# Patient Record
(demographics unavailable — no encounter records)

---

## 2025-03-07 NOTE — HISTORY OF PRESENT ILLNESS
[de-identified] : Date of initial evaluation: 03/07/2025 Patient age: 14 year Body part causing symptoms: right knee Location of the pain: lateral Pain score today: 8/10 Duration of symptoms: 6 days History of injury: she fell and twisted the knee, felt a pop Activities that worsen the pain: walking, bending Radicular symptoms: none Medications attempted for this problem: Tylenol  PT for this problem: none  Injections for this problem: none Previous surgery on this body part: none  Prior imaging: [facility] Occupation: student

## 2025-03-07 NOTE — DISCUSSION/SUMMARY
[de-identified] : (Impression) -right knee lateral meniscus tear, possible ACL tear    The diagnosis was explained in detail. The potential non-surgical and surgical treatments were reviewed. The relative risks and benefits of each option were considered relative to the patient age, activity level, medical history, symptom severity and previously attempted treatments.  The patient was advised to consult with their primary medical provider prior to initiation of any new medications to reduce the risk of adverse effects specific to their long-term home medications and medical history. The risk of gastrointestinal irritation and kidney injury specific to long-term NSAID use was discussed.    (Plan)  -Over the counter NSAID for pain and inflammation, take as needed. -MRI of the knee ordered to evaluate for meniscus and/or ligament tear. -Weight bearing as tolerated. Crutches as needed. -Follow up when imaging completed.     (MDM) Problem Complexity -Moderate: acute, complicated injury   Risk -Low: over the counter medication   Visit Type -New: Patient has not been seen by another provider in my practice within the past 2 years who specializes in orthopedic surgery.

## 2025-03-07 NOTE — IMAGING
[de-identified] : (Exam: Knee)   Laterality is right    Patient is in no acute distress, alert and oriented Sensation is grossly intact to light touch in the foot Motor function is 5/5 in the foot Capillary refill is less than 2 seconds in the toes Lymphadenopathy is not present Peripheral edema is not present   Skin is intact Effusion is trace Atrophy is not present Antalgic gait is present   Medial joint line tenderness is not present Lateral joint line tenderness is present Patellar tenderness is not present Calf tenderness is not present   Knee extension is 3 Knee flexion is 100   Quadriceps strength is 4/5 Hamstring strength is 4/5   Lachman is abnormal Posterior drawer is normal Varus stress stability is normal Valgus stress stability is normal   Medial Marisol test is normal Lateral Marisol test is abnormal Patellofemoral grind test is normal Patellar apprehension test is normal [Right] : right knee [All Views] : anteroposterior, lateral, skyline, and anteroposterior standing [Outside films reviewed] : Outside films reviewed [There are no fractures, subluxations or dislocations. No significant abnormalities are seen] : There are no fractures, subluxations or dislocations. No significant abnormalities are seen

## 2025-03-10 NOTE — HISTORY OF PRESENT ILLNESS
[Gradual] : gradual [] : yes [FreeTextEntry3] : 3/28/25 [FreeTextEntry5] : pt fell and twisted the knee, was seen at [facility] on 3/525 and had Xrs taken.